# Patient Record
Sex: FEMALE | Race: ASIAN | Employment: OTHER | URBAN - METROPOLITAN AREA
[De-identification: names, ages, dates, MRNs, and addresses within clinical notes are randomized per-mention and may not be internally consistent; named-entity substitution may affect disease eponyms.]

---

## 2017-10-05 ENCOUNTER — HOSPITAL ENCOUNTER (OUTPATIENT)
Dept: MAMMOGRAPHY | Age: 62
Discharge: HOME | End: 2017-10-05
Attending: FAMILY MEDICINE
Payer: COMMERCIAL

## 2017-10-05 DIAGNOSIS — Z12.31 VISIT FOR SCREENING MAMMOGRAM: ICD-10-CM

## 2017-10-05 PROCEDURE — 77067 SCR MAMMO BI INCL CAD: CPT

## 2017-11-30 ENCOUNTER — HOSPITAL ENCOUNTER (OUTPATIENT)
Dept: LAB | Age: 62
Discharge: HOME | End: 2017-11-30

## 2017-11-30 ENCOUNTER — HOSPITAL ENCOUNTER (OUTPATIENT)
Dept: LAB | Age: 62
Discharge: HOME | End: 2017-11-30
Payer: COMMERCIAL

## 2017-11-30 ENCOUNTER — HOSPITAL ENCOUNTER (OUTPATIENT)
Dept: GENERAL RADIOLOGY | Age: 62
Discharge: HOME | End: 2017-11-30
Payer: COMMERCIAL

## 2017-11-30 DIAGNOSIS — Z01.810 PRE-OPERATIVE CARDIOVASCULAR EXAMINATION: ICD-10-CM

## 2017-11-30 DIAGNOSIS — Z01.811 ENCOUNTER FOR PREOPERATIVE PULMONARY EXAMINATION: ICD-10-CM

## 2017-11-30 PROCEDURE — 93005 ELECTROCARDIOGRAM TRACING: CPT

## 2017-11-30 PROCEDURE — 71020 XR CHEST PA LAT: CPT

## 2017-11-30 PROCEDURE — 99001 SPECIMEN HANDLING PT-LAB: CPT | Performed by: OBSTETRICS & GYNECOLOGY

## 2017-12-01 LAB
ATRIAL RATE: 62 BPM
CALCULATED P AXIS, ECG09: 78 DEGREES
CALCULATED R AXIS, ECG10: 65 DEGREES
CALCULATED T AXIS, ECG11: 68 DEGREES
DIAGNOSIS, 93000: NORMAL
P-R INTERVAL, ECG05: 158 MS
Q-T INTERVAL, ECG07: 412 MS
QRS DURATION, ECG06: 88 MS
QTC CALCULATION (BEZET), ECG08: 418 MS
VENTRICULAR RATE, ECG03: 62 BPM

## 2018-07-05 ENCOUNTER — OFFICE VISIT (OUTPATIENT)
Dept: UROLOGY | Age: 63
End: 2018-07-05

## 2018-07-05 VITALS
SYSTOLIC BLOOD PRESSURE: 121 MMHG | HEIGHT: 60 IN | WEIGHT: 110 LBS | DIASTOLIC BLOOD PRESSURE: 76 MMHG | BODY MASS INDEX: 21.6 KG/M2 | HEART RATE: 75 BPM | OXYGEN SATURATION: 95 %

## 2018-07-05 DIAGNOSIS — N95.2 ATROPHIC VAGINITIS: ICD-10-CM

## 2018-07-05 DIAGNOSIS — N39.0 RECURRENT UTI: Primary | ICD-10-CM

## 2018-07-05 RX ORDER — ESTRADIOL 0.1 MG/G
CREAM VAGINAL
Qty: 42.5 G | Refills: 6 | Status: SHIPPED | OUTPATIENT
Start: 2018-07-05 | End: 2018-07-05 | Stop reason: SDUPTHER

## 2018-07-05 RX ORDER — CIPROFLOXACIN 250 MG/1
TABLET, FILM COATED ORAL EVERY 12 HOURS
COMMUNITY
End: 2018-10-18

## 2018-07-05 RX ORDER — IBANDRONATE SODIUM 150 MG/1
150 TABLET, FILM COATED ORAL
COMMUNITY
Start: 2018-03-31

## 2018-07-05 RX ORDER — ESTRADIOL 0.1 MG/G
CREAM VAGINAL
Qty: 42.5 G | Refills: 0 | Status: SHIPPED | OUTPATIENT
Start: 2018-07-05

## 2018-07-05 RX ORDER — PHENAZOPYRIDINE HYDROCHLORIDE 200 MG/1
TABLET, FILM COATED ORAL
COMMUNITY

## 2018-07-05 NOTE — PROGRESS NOTES
Ms. Candido Ward has a reminder for a \"due or due soon\" health maintenance. I have asked that she contact her primary care provider for follow-up on this health maintenance. RBV Per Dr. Stanford Goodwin Cream 0.1 % apply 0.5 grams with fingertip to urethral opening twice weekly #42.5 grams with 6 refills sent to pharmacy.

## 2018-07-05 NOTE — MR AVS SNAPSHOT
5 [facility] [address] [facility] [address] 
[phone] Patient: Be Menchaca MRN: [id number] OVY:[birth year] Visit Information Date & Time Provider Department Dept. Phone Encounter #  
 7/5/2018  2:15 PM Gagan Castro, Brad [facility] [phone] Upcoming Health Maintenance Date Due Hepatitis C Screening 1955 DTaP/Tdap/Td series (1 - Tdap) 9/28/1974 PAP AKA CERVICAL CYTOLOGY 9/28/1974 FOBT Q 1 YEAR AGE 50-75 9/28/2003 ZOSTER VACCINE AGE 60> 7/28/2013 Bone Densitometry (Dexa) Screening 9/28/2018 Influenza Age 5 to Adult 8/1/2018 BREAST CANCER SCRN MAMMOGRAM 10/5/2019 Allergies as of 7/5/2018  Review Complete On: 7/5/2018 By: Gagan Castro MD  
 No Known Allergies Current Immunizations  Never Reviewed No immunizations on file. Not reviewed this visit You Were Diagnosed With   
  
 Codes Comments Recurrent UTI    -  Primary ICD-10-CM: N39.0 ICD-9-CM: 599.0 Vitals BP Pulse Height(growth percentile) Weight(growth percentile) SpO2 BMI  
 121/76 (BP 1 Location: Left arm, BP Patient Position: Sitting) 75 5' (1.524 m) 110 lb (49.9 kg) 95% 21.48 kg/m2 Smoking Status Never Smoker Vitals History BMI and BSA Data Body Mass Index Body Surface Area  
 21.48 kg/m 2 1.45 m 2 Preferred Pharmacy Pharmacy Name Phone [address], [address] [place] [address] [phone] Your Updated Medication List  
  
   
This list is accurate as of 7/5/18  2:55 PM.  Always use your most recent med list.  
  
  
  
  
 CIPRO 250 mg tablet Generic drug:  ciprofloxacin HCl Take  by mouth every twelve (12) hours. ibandronate 150 mg tablet Commonly known as:  [place] Cinnamon Take 150 mg by mouth every thirty (30) days. PYRIDIUM 200 mg tablet Generic drug:  phenazopyridine Take  by mouth three (3) times daily as needed for Pain. URIBEL PO Take  by mouth. We Performed the Following AMB POC URINALYSIS DIP STICK AUTO W/O MICRO [16508 CPT(R)] IN LORIE,POST-VOID RES,US,NON-IMAGING [id number] CPT(R)] Patient Instructions Frequent Urination: Care Instructions Your Care Instructions An urge to urinate frequently but usually passing only small amounts of urine is a common symptom of urinary problems, such as urinary tract infections. The bladder may become inflamed. This can cause the urge to urinate. You may try to urinate more often than usual to try to soothe that urge. Frequent urination also may be caused by sexually transmitted infections (STIs) or kidney stones. Or it may happen when something irritates the tube that carries urine from the bladder to the outside of the body (urethra). It may also be a sign of diabetes. The cause may be hard to find. You may need tests. Follow-up care is a key part of your treatment and safety. Be sure to make and go to all appointments, and call your doctor if you are having problems. It's also a good idea to know your test results and keep a list of the medicines you take. How can you care for yourself at home? · Drink extra water for the next day or two. This will help make the urine less concentrated. (If you have kidney, heart, or liver disease and have to limit fluids, talk with your doctor before you increase the amount of fluids you drink.) · Avoid drinks that are carbonated or have caffeine. They can irritate the bladder. For women: · Urinate right after you have sex. · After you go to the bathroom, wipe from front to back. · Avoid douches, bubble baths, and feminine hygiene sprays. And avoid other feminine hygiene products that have deodorants. When should you call for help? Call your doctor now or seek immediate medical care if: ? · You have new symptoms, such as fever, nausea, or vomiting. ? · You have new or worse symptoms of a urinary problem. For example: ¨ You have blood or pus in your urine. ¨ You have chills or body aches. ¨ It hurts to urinate. ¨ You have groin or belly pain. ¨ You have pain in your back just below your rib cage (the flank area). ? Watch closely for changes in your health, and be sure to contact your doctor if you feel thirstier than usual. 
Where can you learn more? Go to http://елена-mariela.info/. Enter 486 9543 in the search box to learn more about \"Frequent Urination: Care Instructions. \" Current as of: May 12, 2017 Content Version: 11.4 © 8046-3598 [employer]. Care instructions adapted under license by [employer] (which disclaims liability or warranty for this information). If you have questions about a medical condition or this instruction, always ask your healthcare professional. [place] [address] any warranty or liability for your use of this information. Introducing hospitals & HEALTH SERVICES! [facility] introduces Gentis patient portal. Now you can access parts of your medical record, email your doctor's office, and request medication refills online. 1. In your internet browser, go to [email or web address] 2. Click on the First Time User? Click Here link in the Sign In box. You will see the New Member Sign Up page. 3. Enter your Gentis Access Code exactly as it appears below. You will not need to use this code after youve completed the sign-up process. If you do not sign up before the expiration date, you must request a new code. · Gentis Access Code: [email or web address] Expires: 10/3/2018  2:55 PM 
 
4. Enter the last four digits of your Social Security Number (xxxx) and Date of Birth (mm/dd/yyyy) as indicated and click Submit. You will be taken to the next sign-up page. 5. Create a Navigenics ID. This will be your Navigenics login ID and cannot be changed, so think of one that is secure and easy to remember. 6. Create a Navigenics password. You can change your password at any time. 7. Enter your Password Reset Question and Answer. This can be used at a later time if you forget your password. 8. Enter your e-mail address. You will receive e-mail notification when new information is available in [address] 9. Click Sign Up. You can now view and download portions of your medical record. 10. Click the Download Summary menu link to download a portable copy of your medical information. If you have questions, please visit the Frequently Asked Questions section of the Navigenics website. Remember, Navigenics is NOT to be used for urgent needs. For medical emergencies, dial 911. Now available from your iPhone and Android! Please provide this summary of care documentation to your next provider. Your primary care clinician is listed as [address]. If you have any questions after today's visit, please call [phone].

## 2018-07-05 NOTE — PATIENT INSTRUCTIONS
Frequent Urination: Care Instructions  Your Care Instructions  An urge to urinate frequently but usually passing only small amounts of urine is a common symptom of urinary problems, such as urinary tract infections. The bladder may become inflamed. This can cause the urge to urinate. You may try to urinate more often than usual to try to soothe that urge. Frequent urination also may be caused by sexually transmitted infections (STIs) or kidney stones. Or it may happen when something irritates the tube that carries urine from the bladder to the outside of the body (urethra). It may also be a sign of diabetes. The cause may be hard to find. You may need tests. Follow-up care is a key part of your treatment and safety. Be sure to make and go to all appointments, and call your doctor if you are having problems. It's also a good idea to know your test results and keep a list of the medicines you take. How can you care for yourself at home? · Drink extra water for the next day or two. This will help make the urine less concentrated. (If you have kidney, heart, or liver disease and have to limit fluids, talk with your doctor before you increase the amount of fluids you drink.)  · Avoid drinks that are carbonated or have caffeine. They can irritate the bladder. For women:  · Urinate right after you have sex. · After you go to the bathroom, wipe from front to back. · Avoid douches, bubble baths, and feminine hygiene sprays. And avoid other feminine hygiene products that have deodorants. When should you call for help? Call your doctor now or seek immediate medical care if:  ? · You have new symptoms, such as fever, nausea, or vomiting. ? · You have new or worse symptoms of a urinary problem. For example:  ¨ You have blood or pus in your urine. ¨ You have chills or body aches. ¨ It hurts to urinate. ¨ You have groin or belly pain. ¨ You have pain in your back just below your rib cage (the flank area). ?Watch closely for changes in your health, and be sure to contact your doctor if you feel thirstier than usual.  Where can you learn more? Go to http://елена-mariela.info/. Enter 728 [id number] in the search box to learn more about \"Frequent Urination: Care Instructions. \"  Current as of: May 12, 2017  Content Version: 11.4  © 0459-3600 ROLI. Care instructions adapted under license by [employer] (which disclaims liability or warranty for this information). If you have questions about a medical condition or this instruction, always ask your healthcare professional. [place] [address] any warranty or liability for your use of this information.

## 2018-07-05 NOTE — PROGRESS NOTES
Chief Complaint   Patient presents with    New Patient    Recurrent UTI       HISTORY OF PRESENT ILLNESS:  Myles Deluna is a 57 y.o. female comes in with a history of recurrent urinary and infections usually manifested by dysuria which she has had blood on one occasion several years ago. She has been using uribel when she first has symptoms and usually that mitigates all of her problem. However recently that has not been helping. Earlier this week she developed another severe urinary infection and went to her primary care physician who prescribed Cipro and asked that she be seen over at our office. She has no significant history of kidney disorders or stone disease. She has not had any  gynecologic surgery. She had been using an estrogen patch but it became too expensive and so she stopped doing that about 6 or 8 months ago. She seems to think that that has helped her a good bit. Past Medical History:   Diagnosis Date    Urinary frequency        Past Surgical History:   Procedure Laterality Date    HX DILATION AND CURETTAGE  01/2018       Social History   Substance Use Topics    Smoking status: Never Smoker    Smokeless tobacco: Never Used    Alcohol use No       No Known Allergies    History reviewed. No pertinent family history. Current Outpatient Prescriptions   Medication Sig Dispense Refill    ibandronate (BONIVA) 150 mg tablet Take 150 mg by mouth every thirty (30) days.  meth/meblue/sod phos/psal/hyos (URIBEL PO) Take  by mouth.  ciprofloxacin HCl (CIPRO) 250 mg tablet Take  by mouth every twelve (12) hours.  phenazopyridine (PYRIDIUM) 200 mg tablet Take  by mouth three (3) times daily as needed for Pain.  estradiol (ESTRACE) 0.01 % (0.1 mg/gram) vaginal cream Apply 0.5 grams with fingertip to urethral opening twice weekly 42.5 g 0           REVIEW OF SYSTEMS:   Noted on the chart.       PHYSICAL EXAMINATION:     Visit Vitals    /76 (BP 1 Location: Left arm, BP Patient Position: Sitting)    Pulse 75    Ht 5' (1.524 m)    Wt 110 lb (49.9 kg)    SpO2 95%    BMI 21.48 kg/m2     Constitutional: Well developed, well-nourished female in no acute distress. CV:  No peripheral swelling noted  Respiratory: No respiratory distress or difficulties  Abdomen:  Soft and nontender. No masses. No hepatosplenomegaly.  Female:  No CVA tenderness. A pelvic is deferred today. Skin:  Normal color. No evidence of jaundice. Neuro/Psych:  Patient with appropriate affect. Alert and oriented. Lymphatic:   No enlargement of supraclavicular lymph nodes. Results for orders placed or performed during the hospital encounter of 11/30/17   EKG, 12 LEAD, INITIAL   Result Value Ref Range    Ventricular Rate 62 BPM    Atrial Rate 62 BPM    P-R Interval 158 ms    QRS Duration 88 ms    Q-T Interval 412 ms    QTC Calculation (Bezet) 418 ms    Calculated P Axis 78 degrees    Calculated R Axis 65 degrees    Calculated T Axis 68 degrees    Diagnosis       Normal sinus rhythm  Normal ECG  No previous ECGs available  Confirmed by Bhupinder Webb ([id number]) on 12/1/2017 7:56:05 AM           REVIEW OF LABS AND IMAGING:      Imaging Report Reviewed? NO      Images Reviewed? NO           Other Lab Data Reviewed? YES    ASSESSMENT:     ICD-10-CM ICD-9-CM    1. Recurrent UTI N39.0 599.0 AMB POC URINALYSIS DIP STICK AUTO W/O MICRO      CA LORIE,POST-VOID RES,US,NON-IMAGING   2. Atrophic vaginitis N95.2 627.3 estradiol (ESTRACE) 0.01 % (0.1 mg/gram) vaginal cream      DISCONTINUED: estradiol (ESTRACE) 0.01 % (0.1 mg/gram) vaginal cream                PLAN/DISCUSSION: She and I had a lengthy discussion about the use of estrogen type creams in ladies who are past menopause and she clearly benefit from this I think. I have had a prescription for Estrace vaginal cream 42.5 g sent to the [facility] [address]. I would like to see her again in 3 months.   We will culture her urine today just to be sure that she is not growing out a bacteria resistant to Cipro. Patient voices understanding and agreement to the plan. Lia Pardo MD on 7/5/2018           Please note: This document has been produced using voice recognition software. Unrecognized errors in transcription may be present.

## 2018-07-06 LAB
BILIRUB UR QL STRIP: NEGATIVE
GLUCOSE UR-MCNC: NEGATIVE MG/DL
KETONES P FAST UR STRIP-MCNC: NEGATIVE MG/DL
PH UR STRIP: 5 [PH] (ref 4.6–8)
PROT UR QL STRIP: NEGATIVE
SP GR UR STRIP: 1.02 (ref 1–1.03)
UA UROBILINOGEN AMB POC: NORMAL (ref 0.2–1)
URINALYSIS CLARITY POC: NORMAL
URINALYSIS COLOR POC: NORMAL
URINE BLOOD POC: NEGATIVE
URINE LEUKOCYTES POC: NEGATIVE
URINE NITRITES POC: POSITIVE

## 2018-07-07 LAB — BACTERIA UR CULT: NO GROWTH

## 2018-09-18 ENCOUNTER — HOSPITAL ENCOUNTER (OUTPATIENT)
Dept: GENERAL RADIOLOGY | Age: 63
Discharge: HOME | End: 2018-09-18
Payer: MEDICARE

## 2018-09-18 DIAGNOSIS — R20.0 NUMBNESS AND TINGLING IN LEFT ARM: ICD-10-CM

## 2018-09-18 DIAGNOSIS — R20.2 NUMBNESS AND TINGLING IN LEFT ARM: ICD-10-CM

## 2018-09-18 PROCEDURE — 72040 X-RAY EXAM NECK SPINE 2-3 VW: CPT

## 2018-09-18 PROCEDURE — 73030 X-RAY EXAM OF SHOULDER: CPT

## 2018-09-20 ENCOUNTER — HOSPITAL ENCOUNTER (OUTPATIENT)
Dept: PHYSICAL THERAPY | Age: 63
Discharge: HOME | End: 2018-09-20
Payer: MEDICARE

## 2018-09-20 PROCEDURE — 97110 THERAPEUTIC EXERCISES: CPT

## 2018-09-20 PROCEDURE — 97162 PT EVAL MOD COMPLEX 30 MIN: CPT

## 2018-09-20 PROCEDURE — G8984 CARRY CURRENT STATUS: HCPCS

## 2018-09-20 PROCEDURE — 97535 SELF CARE MNGMENT TRAINING: CPT

## 2018-09-20 PROCEDURE — G8985 CARRY GOAL STATUS: HCPCS

## 2018-09-20 NOTE — PROGRESS NOTES
In [address]  [place] [address] [place], [address] 
[phone] fax Plan of Care/ Statement of Necessity for Physical Therapy Services Patient name: Ketty Cruz Start of Care: 2018 Referral source: Alison Leone MD : 1955 Medical Diagnosis: Left shoulder pain [M25.512] Left shoulder tendinitis [M75.82] Onset Date:5-6 months ago Treatment Diagnosis: Left shoulder adhesive capsulitis Prior Hospitalization: see medical history Provider#: [id number] Medications: Verified on Patient summary List  
 Comorbidities: unremarkable per pt report Prior Level of Function: walking 3 days/ week for an hour, occasional swimming and other exercise The Plan of Care and following information is based on the information from the initial evaluation. Assessment/ key information: Pt is a 57year old female presenting with onset of L shoulder pain 5-6 months ago with reported of limited ability to reach out to put on her seatbelt, recah behind or overhead, and pain limited ease of swimming. She also reports intermittent \"lightning\" pains referring into her left arm with movement and pain provocation in her L shoulder with Left sidelying. She reports her pain has gradually worsened over time. To date, she has utilized NSAIDs, acupuncture, and a PCP f/u with 2 cortizone injections 2 days ago with some limited relief. X-ray imaging of her neck and shoulder were unremarkable. Signs and symptoms appear consistent with L GHJ adhesive capsulitis as evidenced by painful capsular limitations in L shoulder passive mobility with associated painful active ROM, minimal noted weakness in the LUE, and (-) cervical screen/neurological screen. Pt will benefit from skilled PT to address her impairments and improve her level of function to facilitate return to PLOF.  
Evaluation Complexity History MEDIUM  Complexity : 1-2 comorbidities / personal factors will impact the outcome/ POC ; Examination MEDIUM Complexity : 3 Standardized tests and measures addressing body structure, function, activity limitation and / or participation in recreation  ;Presentation MEDIUM Complexity : Evolving with changing characteristics  ; Clinical Decision Making MEDIUM Complexity : FOTO score of 26-74 Overall Complexity Rating: MEDIUM Problem List: pain affecting function, decrease ROM, decrease strength, decrease ADL/ functional abilitiies, decrease activity tolerance and decrease flexibility/ joint mobility Treatment Plan may include any combination of the following: Therapeutic exercise, Therapeutic activities, Neuromuscular re-education, Physical agent/modality, Manual therapy, Patient education and Self Care training Patient / Family readiness to learn indicated by: asking questions, trying to perform skills and interest 
Persons(s) to be included in education: patient (P) Barriers to Learning/Limitations: None Patient Goal (s): Not reported. Patient Self Reported Health Status: good Rehabilitation Potential: excellent Short Term Goals: To be accomplished in 2 weeks: 1. Patient will report performance of HEP at least 2 times per day to facilitate improved outcomes and improved self management. 2. Pt will demonstrate L shoulder PROM ABD to 120, ER @ 90 to 65, and IR @ 90 to 45 or better to indicate improving GHJ mobility and improve ease of personal grooming activities. Long Term Goals: To be accomplished in 8 weeks: 1. Patient will report FOTO score of 67 or better to indicate significant improvement in functional status. 2. Pt will demonstrate L shoulder AROM flexion to 145 or better to improve ease of OH reach. 3. Pt will demonstrate L shoulder PROM to , ER @ 90 to 80, and IR @ 90 to 60 or better to improve capsular mobility and facilitate return to PLOF. 4. Pt will demonstrate L shoulder strength 5/5 to improve ease of OH reach. Frequency / Duration: Patient to be seen 2 times per week for 8 weeks. Patient/ Caregiver education and instruction: Diagnosis, prognosis, self care, activity modification and exercises 
 [x]  Plan of care has been reviewed with PTA 
 
G-Codes (GP) Carry  Current  CK= 40-59%  Goal  CJ= 20-39% The severity rating is based on clinical judgment and the FOTO score. Certification Period: 9/20/2018 - 11/20/2018 Dre Hatfield PT, DPT, ATC 9/20/2018 11:39 AM 
 
________________________________________________________________________ I certify that the above Therapy Services are being furnished while the patient is under my care. I agree with the treatment plan and certify that this therapy is necessary. [de-identified] Signature:____________________  Date:____________Time: _________ Please sign and return to In [address] [place] ([place]), [address] 
[phone] fax

## 2018-09-20 NOTE — PROGRESS NOTES
PT DAILY TREATMENT NOTE/SHOULDER EVAL 3-16 Patient Name: Jackie Ku Date:2018 : 1955 [x]  Patient  Verified Payor: [place] [facility] / Plan: [address] / Product Type: Medicare / In time:10:05am  Out time:10:46am 
Total Treatment Time (min): 41 Total Timed Codes (min): 25 
1:1 Treatment Time ([facility] only): 41 Visit #: 1 of 16 Treatment Area: Left shoulder pain [M25.512] Left shoulder tendinitis [M75.82] SUBJECTIVE Pain Level (0-10 scale): 6 [x]constant []intermittent []improving [x]worsening []no change since onset Any medication changes, allergies to medications, adverse drug reactions, diagnosis change, or new procedure performed?: [x] No    [] Yes (see summary sheet for update) Subjective functional status/changes:    
Pt reports onset of L shoulder pain 5-6 months ago with reported of limited ability to reach out to put on her seatbelt, recah behind or overhead, and pain limited ease of swimming. She also reports intermittent \"lightning\" pains referring into her left arm with movement and pain provocation in her L shoulder with Left sidelying. She reports her pain has gradually worsened over time. To date, she has utilized NSAIDs, acupuncture, and a PCP f/u with 2 cortizone injections 2 days ago with some limited relief. X-ray imaging of her neck and shoulder were unremarkable. PLOF: walking 3 days/ week for an hour, occasional swimming and other exercise Limitations to PLOF: limited ease of shoulder movement and elevation as above Mechanism of Injury: insidious onset Current symptoms/Complaints: see above Previous Treatment/Compliance: see above PMHx/Surgical Hx: unremarkable per pt report Work Hx: see intake Living Situation:  
Pt Goals: see intake Barriers: []pain []financial []time []transportation []other Motivation: appears well motivated and cooperative Substance use: []Alcohol []Tobacco []other:  
FABQ Score: []low []elevate Cognition: A & O x 4    Other: OBJECTIVE/EXAMINATION Domestic Life:  
Activity/Recreational Limitations:  
Mobility:  
Self Care:  
 
16 min [x]Eval                  []Re-Eval  
 
12 min Therapeutic Exercise:  [] See flow sheet : HEP creation and instruction per handout Rationale: increase ROM and increase strength to improve the patients ability to improve ADL ease. Self Care: 13 minutes pt education regarding relevant anatomy, diagnosis, prognosis, plan of care, activity modification, self modality use, to facilitate pt self management and improve outcomes. With 
 [] TE 
 [] TA 
 [] neuro 
 [] other: Patient Education: [x] Review HEP [] Progressed/Changed HEP based on:  
[] positioning   [] body mechanics   [] transfers   [] heat/ice application   
[] other:   
 
Other Objective/Functional Measures:  
 
Physical Therapy Evaluation - Shoulder Posture: [] Poor    [] Fair    [x] Good    Describe: 
 
ROM:  [] Unable to assess at this time AROM                                                              PROM Left Right  Left Right Flexion 112 177 Flexion 136 185 Extension   Extension Scaption/ABD   Scaptin/ABD 96 170 ER @ 0 Degrees   ER @ 0 Degrees ER @ 90 Degrees   ER @ 90 Degrees 50 110 IR @ 90 Degrees   IR @ 90 Degrees 26 80 JUANITO: R T2, L C6 pain FIR: R T5, L L5 pain End Feel / Painful Arc: 
 
Strength:   [] Unable to assess at this time L (1-5) R (1-5) Pain Flexors 4+ 5 [x] Yes   [] No  
Abductors 4 4+ [x] Yes   [] No  
External Rotators 4+ 5 [] Yes   [x] No  
Internal Rotators 5 5 [] Yes   [x] No  
Supraspinatus   [] Yes   [] No  
Serratus Anterior   [] Yes   [] No  
Lower Trapezius   [] Yes   [] No  
Elbow Flexion 5 5 [] Yes   [] No  
Elbow Extension 5 5 [] Yes   [] No  
 
 
Scapulohumoral Control / Rhythm: Able to eccentrically lower with good control? Left: [] Yes   [x] No     Right: [x] Yes   [] No 
 
Accessory Motions: 
 
Palpation 
[x] Min  [] Mod  [] Severe    Location: GHJ, bicipital sulcus [] Min  [] Mod  [] Severe    Location: 
[] Min  [] Mod  [] Severe    Location: 
 
Optional Tests:   
Sensation Left Right Reflexes Left Right Biceps (C5) WNL WNL Biceps (C5) 2+ 2+ Mello Radial(C6-7) WNL WNL Brachioradialis (C6) 1+ 1+ Mello Ulnar(C8-T1) WNL WNL Triceps (C7) 2+ 2+ Adson's Test  [] Pos   [] Neg Yergason's Test [] Pos   [] Neg 
Yarely's Test  [] Pos   [] Neg Worcester's Sign [] Pos   [] Neg Neer's Test  [] Pos   [] Neg Clunk Test  [] Pos   [] Neg 
Hawkin's Test  [] Pos   [] Neg AC Joint  [] Pos   [] Neg 
Speed's Test  [] Pos   [] Neg SC Joint  [] Pos   [] Neg 
Empty Can  [] Pos   [] Neg Pectoral Tightness [] Pos   [] Neg Anterior Apprehension [] Pos   [] Neg  
Posterior Apprehension [] Pos   [] Neg Other Tests / Comments:   
 
Pain Level (0-10 scale) post treatment: 6 
 
ASSESSMENT/Changes in Function: Per POC. Patient will continue to benefit from skilled PT services to modify and progress therapeutic interventions, address functional mobility deficits, address ROM deficits, address strength deficits, analyze and address soft tissue restrictions, analyze and cue movement patterns, analyze and modify body mechanics/ergonomics and instruct in home and community integration to attain remaining goals. [x]  See Plan of Care 
[]  See progress note/recertification 
[]  See Discharge Summary Progress towards goals / Updated goals: 
Per POC. PLAN [x]  Upgrade activities as tolerated     [x]  Continue plan of care 
[]  Update interventions per flow sheet      
[]  Discharge due to:_ 
[x]  Other:_emphasize shoulder ROM and low load long duration stretching, as mobility improves, provide neuro re-ed PRN and progress towards independent HEP.    
 
Aretha Leyden 9/20/2018  10:13 AM

## 2018-09-25 ENCOUNTER — HOSPITAL ENCOUNTER (OUTPATIENT)
Dept: PHYSICAL THERAPY | Age: 63
Discharge: HOME | End: 2018-09-25
Payer: MEDICARE

## 2018-09-25 PROCEDURE — 97140 MANUAL THERAPY 1/> REGIONS: CPT

## 2018-09-25 PROCEDURE — 97110 THERAPEUTIC EXERCISES: CPT

## 2018-09-25 NOTE — PROGRESS NOTES
PT DAILY TREATMENT NOTE - [facility]  Patient Name: Tory Dahl Date:2018 : 1955 [x]  Patient  Verified Payor: [place] MEDICARE / Plan: [address] / Product Type: Medicare / In time:11:30  Out time:12:12 Total Treatment Time (min): 42 Total Timed Codes (min): 32 
1:1 Treatment Time ( only): 30 Visit #: 2 of 16 Treatment Area: Left shoulder pain [M25.512] Left shoulder tendinitis [M75.82] SUBJECTIVE Pain Level (0-10 scale): 4-5/10 Any medication changes, allergies to medications, adverse drug reactions, diagnosis change, or new procedure performed?: [x] No    [] Yes (see summary sheet for update) Subjective functional status/changes:   [] No changes reported Pt reports no new complaints of pain. Pt reports performing HEP and having decreased pain and increased movement in her shoulder OBJECTIVE Modality rationale: decrease inflammation and decrease pain to improve the patients ability to tolerate ADLs. Min Type Additional Details  
 [] Estim:  []Unatt       []IFC  []Premod []Other:  []w/ice   []w/heat Position: Location:  
 [] Estim: []Att    []TENS instruct  []NMES []Other:  []w/US   []w/ice   []w/heat Position: Location:  
 []  Traction: [] Cervical       []Lumbar 
                     [] Prone          []Supine []Intermittent   []Continuous Lbs: 
[] before manual 
[] after manual  
 []  Ultrasound: []Continuous   [] Pulsed []1MHz   []3MHz W/cm2: 
Location:  
 []  Iontophoresis with dexamethasone Location: [] Take home patch  
[] In clinic  
10 [x]  Ice     []  heat 
[]  Ice massage 
[]  Laser  
[]  Anodyne Position: supine Location: left shoulder  
 []  Laser with stim 
[]  Other:  Position: Location:  
 []  Vasopneumatic Device Pressure:       [] lo [] med [] hi  
Temperature: [] lo [] med [] hi  
 [] Skin assessment post-treatment:  []intact []redness- no adverse reaction 
  []redness  adverse reaction:  
 
24 min Therapeutic Exercise:  [x] See flow sheet :  
Rationale: increase ROM and increase strength to improve the patients ability to tolerate ADLs. 8 min Manual Therapy:  PROM left shoulder Rationale: decrease pain, increase ROM and increase tissue extensibility to improve tolerance to functional activities. With 
 [] TE 
 [] TA 
 [] neuro 
 [] other: Patient Education: [x] Review HEP [] Progressed/Changed HEP based on:  
[] positioning   [] body mechanics   [] transfers   [] heat/ice application   
[] other:   
 
Other Objective/Functional Measures: Initiated exercises as per flow sheet. Pain Level (0-10 scale) post treatment: 4-5/10 ASSESSMENT/Changes in Function: Pt continues to demonstrate left shoulder capsular tightness with improved shoulder flexion noted. Patient will continue to benefit from skilled PT services to modify and progress therapeutic interventions, address functional mobility deficits, address ROM deficits, address strength deficits, analyze and address soft tissue restrictions, analyze and cue movement patterns and analyze and modify body mechanics/ergonomics to attain remaining goals. []  See Plan of Care 
[]  See progress note/recertification 
[]  See Discharge Summary Progress towards goals / Updated goals: 
Short Term Goals: To be accomplished in 2 weeks: 1. Patient will report performance of HEP at least 2 times per day to facilitate improved outcomes and improved self management. - Met per patient report. 9/25/18 2. Pt will demonstrate L shoulder PROM ABD to 120, ER @ 90 to 65, and IR @ 90 to 45 or better to indicate improving GHJ mobility and improve ease of personal grooming activities. Long Term Goals: To be accomplished in 8 weeks: 1. Patient will report FOTO score of 67 or better to indicate significant improvement in functional status. 2. Pt will demonstrate L shoulder AROM flexion to 145 or better to improve ease of OH reach. 3. Pt will demonstrate L shoulder PROM to , ER @ 90 to 80, and IR @ 90 to 60 or better to improve capsular mobility and facilitate return to PLOF. 4. Pt will demonstrate L shoulder strength 5/5 to improve ease of OH reach. PLAN 
[]  Upgrade activities as tolerated     [x]  Continue plan of care 
[]  Update interventions per flow sheet      
[]  Discharge due to:_ 
[]  Other:_ Brennan Cadena, KULWANT 9/25/2018  11:44 AM 
 
Future Appointments Date Time Provider Kristi Rich 9/28/2018 10:00 AM [facility]  
10/2/2018 11:00 AM [facility]ev Part[facility]  
10/5/2018 11:00 AM Rajeev Part, PTA [facility]  
10/9/2018 11:00 AM Rajeev Part, PTA [facility]  
10/12/2018 11:00 AM Brennan Cadena, [facility]  
10/16/2018 11:00 AM PAU [facility]  
10/18/2018 10:15 AM Layo Trotter MD [address] [place] [address]

## 2018-09-28 ENCOUNTER — HOSPITAL ENCOUNTER (OUTPATIENT)
Dept: PHYSICAL THERAPY | Age: 63
Discharge: HOME | End: 2018-09-28
Payer: MEDICARE

## 2018-09-28 PROCEDURE — 97140 MANUAL THERAPY 1/> REGIONS: CPT

## 2018-09-28 PROCEDURE — 97110 THERAPEUTIC EXERCISES: CPT

## 2018-09-28 NOTE — PROGRESS NOTES
PT DAILY TREATMENT NOTE - [facility]  Patient Name: Adrienne Cha Date:2018 : 1955 [x]  Patient  Verified Payor: [place] MEDICARE / Plan: [address] / Product Type: Medicare / In time:10:00  Out time:10:48 Total Treatment Time (min): 48 Total Timed Codes (min): 38 
1:1 Treatment Time ( only): 38 Visit #: 3 of 16 Treatment Area: Left shoulder pain [M25.512] Left shoulder tendinitis [M75.82] SUBJECTIVE Pain Level (0-10 scale): 4-5/10 Any medication changes, allergies to medications, adverse drug reactions, diagnosis change, or new procedure performed?: [x] No    [] Yes (see summary sheet for update) Subjective functional status/changes:   [] No changes reported \"I've been exercising a lot, I can raise higher but still some pain. \" OBJECTIVE Modality rationale: decrease pain to improve the patients ability to perform ADL's. Min Type Additional Details  
 [] Estim:  []Unatt       []IFC  []Premod []Other:  []w/ice   []w/heat Position: Location:  
 [] Estim: []Att    []TENS instruct  []NMES []Other:  []w/US   []w/ice   []w/heat Position: Location:  
 []  Traction: [] Cervical       []Lumbar 
                     [] Prone          []Supine []Intermittent   []Continuous Lbs: 
[] before manual 
[] after manual  
 []  Ultrasound: []Continuous   [] Pulsed []1MHz   []3MHz W/cm2: 
Location:  
 []  Iontophoresis with dexamethasone Location: [] Take home patch  
[] In clinic  
10 [x]  Ice     []  heat 
[]  Ice massage 
[]  Laser  
[]  Anodyne Position: Supine Location: Left shoulder  
 []  Laser with stim 
[]  Other:  Position: Location:  
 []  Vasopneumatic Device Pressure:       [] lo [] med [] hi  
Temperature: [] lo [] med [] hi  
[] Skin assessment post-treatment:  []intact []redness- no adverse reaction 
  []redness  adverse reaction: 30 min Therapeutic Exercise:  [x] See flow sheet :  
Rationale: increase ROM, increase strength and increase proprioception to improve the patients ability to perform ADL's. 
 
8 min Manual Therapy:  Left ST/GH joint mobs, shoulder PROM, DTM Left UT, lev scap, infraspinatus, subscap. Rationale: decrease pain, increase ROM, increase tissue extensibility and decrease trigger points to improve ease of performing OH activities. With 
 [x] TE 
 [] TA 
 [] neuro 
 [] other: Patient Education: [x] Review HEP [] Progressed/Changed HEP based on:  
[] positioning   [] body mechanics   [] transfers   [] heat/ice application   
[] other:   
 
Other Objective/Functional Measures: Added post-capsule stretch and sleeper stretch 2x30\". Pain Level (0-10 scale) post treatment: 0/10 ASSESSMENT/Changes in Function: Demonstrated improved ease with wall slides, notable PROM improvement. Pt reports improved ease with ADL's and self care. Patient will continue to benefit from skilled PT services to modify and progress therapeutic interventions, address functional mobility deficits, address ROM deficits, address strength deficits, analyze and address soft tissue restrictions and analyze and cue movement patterns to attain remaining goals. [x]  See Plan of Care 
[]  See progress note/recertification 
[]  See Discharge Summary Progress towards goals / Updated goals: 
Short Term Goals: To be accomplished in 2 weeks: 
                      3. Patient will report performance of HEP at least 2 times per day to facilitate improved outcomes and improved self management. - Met per patient report. 9/25/18 
                      2. Pt will demonstrate L shoulder PROM ABD to 120, ER @ 90 to 65, and IR @ 90 to 45 or better to indicate improving GHJ mobility and improve ease of personal grooming activities. Long Term Goals: To be accomplished in 8 weeks:                       7. Patient will report FOTO score of 67 or better to indicate significant improvement in functional status.                       1. Pt will demonstrate L shoulder AROM flexion to 145 or better to improve ease of OH reach. 
                      3. Pt will demonstrate L shoulder PROM to , ER @ 90 to 80, and IR @ 90 to 60 or better to improve capsular mobility and facilitate return to PLOF.                       7. Pt will demonstrate L shoulder strength 5/5 to improve ease of OH reach. PLAN 
[]  Upgrade activities as tolerated     [x]  Continue plan of care 
[]  Update interventions per flow sheet      
[]  Discharge due to:_ 
[]  Other:_   
 
Osmani Rowley PTA 9/28/2018  9:55 AM 
 
Future Appointments Date Time Provider Kristi Perryi 9/28/2018 10:00 AM Osmani Rowley PTA [facility]  
10/2/2018 11:00 AM Osmani Rowley PTA [facility]  
10/5/2018 11:00 AM Osmani Rowley PTA [facility]  
10/9/2018 11:00 AM Osmani Rowley PTA [facility]  
10/12/2018 11:00 AM Ashleigh Aguirre PTA [facility]  
10/16/2018 11:00 AM PA[facility]OSS [facility]  
10/18/2018 10:15 AM Michael Monsalve MD [address] [place] [address]

## 2018-10-02 ENCOUNTER — HOSPITAL ENCOUNTER (OUTPATIENT)
Dept: PHYSICAL THERAPY | Age: 63
Discharge: HOME | End: 2018-10-02
Payer: MEDICARE

## 2018-10-02 PROCEDURE — 97140 MANUAL THERAPY 1/> REGIONS: CPT

## 2018-10-02 PROCEDURE — 97110 THERAPEUTIC EXERCISES: CPT

## 2018-10-02 NOTE — PROGRESS NOTES
PT DAILY TREATMENT NOTE - [facility]  Patient Name: Rosi Mclean Date:10/2/2018 : 1955 [x]  Patient  Verified Payor: [place] MEDICARE / Plan: [address] / Product Type: Medicare / In time:11:00  Out time:11:48 Total Treatment Time (min): 48 Total Timed Codes (min): 38 
1:1 Treatment Time ( only): 30 Visit #: 4 of 16 Treatment Area: Left shoulder pain [M25.512] Left shoulder tendinitis [M75.82] SUBJECTIVE Pain Level (0-10 scale): 5/10 Any medication changes, allergies to medications, adverse drug reactions, diagnosis change, or new procedure performed?: [x] No    [] Yes (see summary sheet for update) Subjective functional status/changes:   [] No changes reported \"\"It feels better but still stiff to the side. \" OBJECTIVE Modality rationale: decrease pain to improve the patients ability to perform ADL's. Min Type Additional Details  
 [] Estim:  []Unatt       []IFC  []Premod []Other:  []w/ice   []w/heat Position: Location:  
 [] Estim: []Att    []TENS instruct  []NMES []Other:  []w/US   []w/ice   []w/heat Position: Location:  
 []  Traction: [] Cervical       []Lumbar 
                     [] Prone          []Supine []Intermittent   []Continuous Lbs: 
[] before manual 
[] after manual  
 []  Ultrasound: []Continuous   [] Pulsed []1MHz   []3MHz W/cm2: 
Location:  
 []  Iontophoresis with dexamethasone Location: [] Take home patch  
[] In clinic  
10 [x]  Ice     []  heat 
[]  Ice massage 
[]  Laser  
[]  Anodyne Position: Supine Location: Left shoulder  
 []  Laser with stim 
[]  Other:  Position: Location:  
 []  Vasopneumatic Device Pressure:       [] lo [] med [] hi  
Temperature: [] lo [] med [] hi  
[] Skin assessment post-treatment:  []intact []redness- no adverse reaction 
  []redness  adverse reaction:  
 
30 min Therapeutic Exercise:  [x] See flow sheet :  
 Rationale: increase ROM, increase strength and increase proprioception to improve the patients ability to perform ADL's. 
 
8 min Manual Therapy:  Left ST/GH joint mobs, DTM/TPR Left infraspinatus, UT, lev scap, subscap. Shoulder PROM. Rationale: decrease pain, increase ROM, increase tissue extensibility and decrease trigger points to improve ease of performing OH activities and reaching laterally for daily chores. With 
 [x] TE 
 [] TA 
 [] neuro 
 [] other: Patient Education: [x] Review HEP [] Progressed/Changed HEP based on:  
[] positioning   [] body mechanics   [] transfers   [] heat/ice application   
[] other:   
 
Other Objective/Functional Measures: Changed supine wand to standing. PROM Left shoulder flexion 165, abd 140, ER 65 @ 65, IR 45 @ 90. Pain Level (0-10 scale) post treatment: 0/10 ASSESSMENT/Changes in Function: Improved PROM since IE. Able to perform wand standing. Significant tightness in Left infraspinatus. Patient will continue to benefit from skilled PT services to modify and progress therapeutic interventions, address functional mobility deficits, address ROM deficits, address strength deficits, analyze and address soft tissue restrictions and analyze and cue movement patterns to attain remaining goals. [x]  See Plan of Care 
[]  See progress note/recertification 
[]  See Discharge Summary Progress towards goals / Updated goals: 
Short Term Goals: To be accomplished in 2 weeks: 
                      4. Patient will report performance of HEP at least 2 times per day to facilitate improved outcomes and improved self management. - Met per patient report. 9/25/18 
                      2. Pt will demonstrate L shoulder PROM ABD to 120, ER @ 90 to 65, and IR @ 90 to 45 or better to indicate improving GHJ mobility and improve ease of personal grooming activities - PROM Left shoulder flexion 165, abd 140, ER 65 @ 65, IR 45 @ 90. 10/2/2018 Long Term Goals: To be accomplished in 8 weeks: 
                      6. Patient will report FOTO score of 67 or better to indicate significant improvement in functional status.                       1. Pt will demonstrate L shoulder AROM flexion to 145 or better to improve ease of OH reach. 
                      3. Pt will demonstrate L shoulder PROM to , ER @ 90 to 80, and IR @ 90 to 60 or better to improve capsular mobility and facilitate return to PLOF.                       8. Pt will demonstrate L shoulder strength 5/5 to improve ease of OH reach. PLAN 
[]  Upgrade activities as tolerated     [x]  Continue plan of care 
[]  Update interventions per flow sheet      
[]  Discharge due to:_ 
[]  Other:_   
 
Micki Labrum, PTA 10/2/2018  11:11 AM 
 
Future Appointments Date Time Provider Kristi Rich 10/5/2018 11:00 AM [facility]  
10/9/2018 11:00 AM Micki Gallego, PTA [facility]  
10/12/2018 11:00 AM Don Davison PTA [facility]  
10/16/2018 11:00 AM [facility]  
10/18/2018 10:15 AM Deborah Herbert MD [address] [place] [address]

## 2018-10-05 ENCOUNTER — HOSPITAL ENCOUNTER (OUTPATIENT)
Dept: PHYSICAL THERAPY | Age: 63
Discharge: HOME | End: 2018-10-05
Payer: MEDICARE

## 2018-10-05 PROCEDURE — 97140 MANUAL THERAPY 1/> REGIONS: CPT

## 2018-10-05 PROCEDURE — 97110 THERAPEUTIC EXERCISES: CPT

## 2018-10-05 NOTE — PROGRESS NOTES
PT DAILY TREATMENT NOTE - [facility]  Patient Name: Hong Joseph Date:10/5/2018 : 1955 [x]  Patient  Verified Payor: [place] MED[place] / Plan: Duarte Beckham / Product Type: Medicare / In time:11:00  Out time:11:46 Total Treatment Time (min): 46 Total Timed Codes (min): 36 
1:1 Treatment Time ( only): 36 Visit #: 5 of 16 Treatment Area: Left shoulder pain [M25.512] Left shoulder tendinitis [M75.82] SUBJECTIVE Pain Level (0-10 scale): 4/10 Any medication changes, allergies to medications, adverse drug reactions, diagnosis change, or new procedure performed?: [x] No    [] Yes (see summary sheet for update) Subjective functional status/changes:   [] No changes reported \"This morning I feel much better. \" OBJECTIVE Modality rationale: decrease pain to improve the patients ability to perform ADL's. Min Type Additional Details  
 [] Estim:  []Unatt       []IFC  []Premod []Other:  []w/ice   []w/heat Position: Location:  
 [] Estim: []Att    []TENS instruct  []NMES []Other:  []w/US   []w/ice   []w/heat Position: Location:  
 []  Traction: [] Cervical       []Lumbar 
                     [] Prone          []Supine []Intermittent   []Continuous Lbs: 
[] before manual 
[] after manual  
 []  Ultrasound: []Continuous   [] Pulsed []1MHz   []3MHz W/cm2: 
Location:  
 []  Iontophoresis with dexamethasone Location: [] Take home patch  
[] In clinic  
10 [x]  Ice     []  heat 
[]  Ice massage  
[]  Laser  
[]  Anodyne Position: Seated Location: Left Shoulder  
 []  Laser with stim 
[]  Other:  Position: Location:  
 []  Vasopneumatic Device Pressure:       [] lo [] med [] hi  
Temperature: [] lo [] med [] hi  
[] Skin assessment post-treatment:  []intact []redness- no adverse reaction 
  []redness  adverse reaction:  
 
28 min Therapeutic Exercise:  [x] See flow sheet :  
 Rationale: increase ROM, increase strength and increase proprioception to improve the patients ability to perform ADL's. 
 
8 min Manual Therapy:  Left ST/GH joint mobs, Shoulder PROM. DTM Left UT, lev scap, infraspinatus, rhomboids, lats. Rationale: decrease pain, increase ROM, increase tissue extensibility and decrease trigger points to improve ease of OH activities. With 
 [x] TE 
 [] TA 
 [] neuro 
 [] other: Patient Education: [x] Review HEP [] Progressed/Changed HEP based on:  
[] positioning   [] body mechanics   [] transfers   [] heat/ice application   
[] other:   
 
Other Objective/Functional Measures: Added standing wand ER, IR and extension 10x each. AROM Left shoulder flexion 153 degrees. Pain Level (0-10 scale) post treatment: 0/10 ASSESSMENT/Changes in Function: FOTO 41%. FOTO decreased however pt verbally reports improve ease with ADL's. Patient will continue to benefit from skilled PT services to modify and progress therapeutic interventions, address functional mobility deficits, address ROM deficits, address strength deficits, analyze and address soft tissue restrictions and analyze and cue movement patterns to attain remaining goals. [x]  See Plan of Care 
[]  See progress note/recertification 
[]  See Discharge Summary Progress towards goals / Updated goals: 
Short Term Goals: To be accomplished in 2 weeks: 
                      0. Patient will report performance of HEP at least 2 times per day to facilitate improved outcomes and improved self management. - Met per patient report. 9/25/18 
                      2. Pt will demonstrate L shoulder PROM ABD to 120, ER @ 90 to 65, and IR @ 90 to 45 or better to indicate improving GHJ mobility and improve ease of personal grooming activities - PROM Left shoulder flexion 165, abd 140, ER 65 @ 65, IR 45 @ 90. 10/2/2018 Long Term Goals: To be accomplished in 8 weeks:                       7. Patient will report FOTO score of 67 or better to indicate significant improvement in functional status. - FOTO 41%.                       7. Pt will demonstrate L shoulder AROM flexion to 145 or better to improve ease of OH reach. - AROM Left shoulder flexion 153 degrees. 10/5/2018 
                      3. Pt will demonstrate L shoulder PROM to , ER @ 90 to 80, and IR @ 90 to 60 or better to improve capsular mobility and facilitate return to PLOF.                       0. Pt will demonstrate L shoulder strength 5/5 to improve ease of OH reach. PLAN 
[]  Upgrade activities as tolerated     [x]  Continue plan of care 
[]  Update interventions per flow sheet      
[]  Discharge due to:_ 
[]  Other:_   
 
Bobbi Montgomery, KULWANT 10/5/2018  11:01 AM 
 
Future Appointments Date Time Provider Kristi Rich 10/9/2018 11:00 AM Bobbi Montgomery PTA [facility]  
10/12/2018 11:00 AM Noemy Castro PTA [facility]  
10/16/2018 11:00 AM PAU Brown [facility]  
10/18/2018 10:15 AM Ruba Brewster MD [address] [place] [address]

## 2018-10-09 ENCOUNTER — HOSPITAL ENCOUNTER (OUTPATIENT)
Dept: PHYSICAL THERAPY | Age: 63
Discharge: HOME | End: 2018-10-09
Payer: MEDICARE

## 2018-10-09 PROCEDURE — 97140 MANUAL THERAPY 1/> REGIONS: CPT

## 2018-10-09 PROCEDURE — 97110 THERAPEUTIC EXERCISES: CPT

## 2018-10-09 NOTE — PROGRESS NOTES
PT DAILY TREATMENT NOTE - [facility]  Patient Name: Klaudia Grubbs Date:10/9/2018 : 1955 [x]  Patient  Verified Payor: [place] MEDICARE / Plan: Duarte Beckham / Product Type: Medicare / In time:11:01  Out time:11:42 Total Treatment Time (min): 41 Total Timed Codes (min): 41 
1:1 Treatment Time ( [address] only): 41 Visit #: 6 of 16 Treatment Area: Left shoulder pain [M25.512] Left shoulder tendinitis [M75.82] SUBJECTIVE Pain Level (0-10 scale): 0/10 Any medication changes, allergies to medications, adverse drug reactions, diagnosis change, or new procedure performed?: [x] No    [] Yes (see summary sheet for update) Subjective functional status/changes:   [] No changes reported \"No pain, a little sore. \" OBJECTIVE 33 min Therapeutic Exercise:  [x] See flow sheet :  
Rationale: increase ROM, increase strength and increase proprioception to improve the patients ability to perform ADL's. 
 
8 min Manual Therapy:  Left ST/GH joint mobs, DTM/TPR Left deltoid, UT, pec minor/major, infraspinatus. Shoulder PROM. Rationale: decrease pain, increase ROM, increase tissue extensibility and decrease trigger points to improve activity tolerance. With 
 [x] TE 
 [] TA 
 [] neuro 
 [] other: Patient Education: [x] Review HEP [] Progressed/Changed HEP based on:  
[] positioning   [] body mechanics   [] transfers   [] heat/ice application   
[] other:   
 
Other Objective/Functional Measures: Added lift off with wall slides. Added t-band rows, ext, horizontal abd, IR and ER 10x each. Pain Level (0-10 scale) post treatment: 0/10 ASSESSMENT/Changes in Function: ROM has improved since IE, added strengthening exercises to routine. Performed new exercises well, discomfort with eccentric phase of T-band ER.  
 
Patient will continue to benefit from skilled PT services to modify and progress therapeutic interventions, address functional mobility deficits, address ROM deficits, address strength deficits, analyze and address soft tissue restrictions and analyze and cue movement patterns to attain remaining goals. [x]  See Plan of Care 
[]  See progress note/recertification 
[]  See Discharge Summary Progress towards goals / Updated goals: 
Short Term Goals: To be accomplished in 2 weeks: 
                      8. Patient will report performance of HEP at least 2 times per day to facilitate improved outcomes and improved self management. - Met per patient report. 9/25/18 
                      2. Pt will demonstrate L shoulder PROM ABD to 120, ER @ 90 to 65, and IR @ 90 to 45 or better to indicate improving GHJ mobility and improve ease of personal grooming activities - PROM Left shoulder flexion 165, abd 140, ER 65 @ 65, IR 45 @ 90. 10/2/2018 Long Term Goals: To be accomplished in 8 weeks:  
                      5. Patient will report FOTO score of 67 or better to indicate significant improvement in functional status. - FOTO 41%.                       4. Pt will demonstrate L shoulder AROM flexion to 145 or better to improve ease of OH reach. - AROM Left shoulder flexion 153 degrees. 10/5/2018 
                      3. Pt will demonstrate L shoulder PROM to , ER @ 90 to 80, and IR @ 90 to 60 or better to improve capsular mobility and facilitate return to PLOF.                       3. Pt will demonstrate L shoulder strength 5/5 to improve ease of OH reach. PLAN 
[]  Upgrade activities as tolerated     [x]  Continue plan of care 
[]  Update interventions per flow sheet      
[]  Discharge due to:_ 
[]  Other:_   
 
Frank Reardon PTA 10/9/2018  11:07 AM 
 
Future Appointments Date Time Provider Kristi Rich 10/12/2018 11:00 AM Viet Gonzalez PTA [facility]  
10/16/2018 11:00 AM PAU [facility]  
10/18/2018 10:15 AM Edinson Carbone MD [address] [place] [address]

## 2018-10-12 ENCOUNTER — HOSPITAL ENCOUNTER (OUTPATIENT)
Dept: PHYSICAL THERAPY | Age: 63
Discharge: HOME | End: 2018-10-12
Payer: MEDICARE

## 2018-10-12 PROCEDURE — 97110 THERAPEUTIC EXERCISES: CPT

## 2018-10-12 PROCEDURE — 97140 MANUAL THERAPY 1/> REGIONS: CPT

## 2018-10-12 NOTE — PROGRESS NOTES
PT DAILY TREATMENT NOTE - [facility]  Patient Name: Cristin Wheeler Date:10/12/2018 : 1955 [x]  Patient  Verified Payor: [place] MEDICARE / Plan: Duarte Berry / Product Type: Medicare / In time:11:00  Out time:11:52 Total Treatment Time (min): 52 Total Timed Codes (min): 42 
1:1 Treatment Time ( only): 38 Visit #: 7 of 16 Treatment Area: Left shoulder pain [M25.512] Left shoulder tendinitis [M75.82] SUBJECTIVE Pain Level (0-10 scale): 4-5/10 Any medication changes, allergies to medications, adverse drug reactions, diagnosis change, or new procedure performed?: [x] No    [] Yes (see summary sheet for update) Subjective functional status/changes:   [] No changes reported Pt reports continued c/o pain in left shoulder OBJECTIVE Modality rationale: decrease inflammation and decrease pain to improve the patients ability to tolerate ADLs. Min Type Additional Details  
 [] Estim:  []Unatt       []IFC  []Premod []Other:  []w/ice   []w/heat Position: Location:  
 [] Estim: []Att    []TENS instruct  []NMES []Other:  []w/US   []w/ice   []w/heat Position: Location:  
 []  Traction: [] Cervical       []Lumbar 
                     [] Prone          []Supine []Intermittent   []Continuous Lbs: 
[] before manual 
[] after manual  
 []  Ultrasound: []Continuous   [] Pulsed []1MHz   []3MHz W/cm2: 
Location:  
 []  Iontophoresis with dexamethasone Location: [] Take home patch  
[] In clinic  
10 [x]  Ice     []  heat 
[]  Ice massage 
[]  Laser  
[]  Anodyne Position: supine Location:left shoulder  
 []  Laser with stim 
[]  Other:  Position: Location:  
 []  Vasopneumatic Device Pressure:       [] lo [] med [] hi  
Temperature: [] lo [] med [] hi  
[] Skin assessment post-treatment:  []intact []redness- no adverse reaction 
  []redness  adverse reaction: 34 min Therapeutic Exercise:  [x] See flow sheet :  
Rationale: increase ROM and increase strength to improve the patients ability to tolerate ADLs. 8 min Manual Therapy:  PROM to left shoulder all planes. STJ mobs Rationale: decrease pain, increase ROM and increase tissue extensibility to improve tolerance to functional activities. With 
 [] TE 
 [] TA 
 [] neuro 
 [] other: Patient Education: [x] Review HEP [] Progressed/Changed HEP based on:  
[] positioning   [] body mechanics   [] transfers   [] heat/ice application   
[] other:   
 
Other Objective/Functional Measures: pain with end range IR/ER. Pt has near full PROM shoulder flexion/abduction. Pain Level (0-10 scale) post treatment: 0/10 ASSESSMENT/Changes in Function: Pt demonstrates improved left shoulder mobility but has continued limitations through IR and ER. Plan to continue PT for 3 weeks. Patient will continue to benefit from skilled PT services to modify and progress therapeutic interventions, address functional mobility deficits, address ROM deficits, address strength deficits and analyze and address soft tissue restrictions to attain remaining goals. []  See Plan of Care 
[]  See progress note/recertification 
[]  See Discharge Summary Progress towards goals / Updated goals: 
Short Term Goals: To be accomplished in 2 weeks: 
                      5. Patient will report performance of HEP at least 2 times per day to facilitate improved outcomes and improved self management. - Met per patient report. 9/25/18 
                      2. Pt will demonstrate L shoulder PROM ABD to 120, ER @ 90 to 65, and IR @ 90 to 45 or better to indicate improving GHJ mobility and improve ease of personal grooming activities - PROM Left shoulder flexion 165, abd 140, ER 65 @ 65, IR 45 @ 90. 10/2/2018 Long Term Goals: To be accomplished in 8 weeks:  
                      8.  Patient will report FOTO score of 67 or better to indicate significant improvement in functional status. - FOTO 41%.                       7. Pt will demonstrate L shoulder AROM flexion to 145 or better to improve ease of OH reach. - AROM Left shoulder flexion 153 degrees. 10/5/2018 
                      3. Pt will demonstrate L shoulder PROM to , ER @ 90 to 80, and IR @ 90 to 60 or better to improve capsular mobility and facilitate return to PLOF.                       9. Pt will demonstrate L shoulder strength 5/5 to improve ease of OH reach. PLAN 
[]  Upgrade activities as tolerated     [x]  Continue plan of care 
[]  Update interventions per flow sheet      
[]  Discharge due to:_ 
[]  Other:_ Ángel Valentino, PTA 10/12/2018  11:19 AM 
 
Future Appointments Date Time Provider Kristi Rich 10/16/2018 10:30 AM Micheline Henao [id number]  
10/18/2018 10:15 AM Claudia Llanes MD [address] [place] [address]

## 2018-10-16 ENCOUNTER — HOSPITAL ENCOUNTER (OUTPATIENT)
Dept: PHYSICAL THERAPY | Age: 63
Discharge: HOME | End: 2018-10-16
Payer: MEDICARE

## 2018-10-16 PROCEDURE — G8985 CARRY GOAL STATUS: HCPCS

## 2018-10-16 PROCEDURE — 97110 THERAPEUTIC EXERCISES: CPT

## 2018-10-16 PROCEDURE — G8984 CARRY CURRENT STATUS: HCPCS

## 2018-10-16 PROCEDURE — 97140 MANUAL THERAPY 1/> REGIONS: CPT

## 2018-10-16 NOTE — PROGRESS NOTES
In [address] [place] [address] [facility], [address] 
[phone] fax Medicare Progress Report Patient name: Isaac Padilla Start of Care: 2018 Referral source: Cecily Alva MD : 1955 Medical/Treatment Diagnosis: Left shoulder pain [M25.512] Left shoulder tendinitis [M75.82] Onset Date: 5-6 months ago Prior Hospitalization: see medical history Provider#: [id number] Medications: Verified on Patient Summary List   
Comorbidities: unremarkable per pt report Prior Level of Function: walking 3 days/ week for an hour, occasional swimming and other exercise Visits from Start of Care: 8    Missed Visits: 0 Reporting Period: 18 to 10/16/18 Subjective Reports:  Patient stated that her shoulder has gotten much better, and she doesn't have as much pain with shoulder flexion, but still has discomfort with abduction and IR. Patient average pain level  Is now a 3-4/10, and prior to PT was 7/10. 
  
Progress towards goals / Updated goals: 
Short Term Goals: To be accomplished in 2 weeks: 
                      6. Patient will report performance of HEP at least 2 times per day to facilitate improved outcomes and improved self management. - Met per patient report. 18 
                      2. Pt will demonstrate L shoulder PROM ABD to 120, ER @ 90 to 65, and IR @ 90 to 45 or better to indicate improving GHJ mobility and improve ease of personal grooming activities - PROM Left shoulder flexion 165, abd 140, ER 65 @ 65, IR 45 @ 90. 10/2/2018 Long Term Goals: To be accomplished in 8 weeks:  
                      4. Patient will report FOTO score of 67 or better to indicate significant improvement in functional status. - FOTO 41%.                       4. Pt will demonstrate L shoulder AROM flexion to 145 or better to improve ease of OH reach. - AROM Left shoulder flexion 155 degrees. 10/16/2018                       3. Pt will demonstrate L shoulder PROM to , ER @ 90 to 80, and IR @ 90 to 60 or better to improve capsular mobility and facilitate return to PLOF. PROM: ER at 90dedeg; IR (@90deg) 70deg; Scap: WFL; Flex: WFL. Tightness noted at end feel into IR and ER. (10/16/18)                       0. Pt will demonstrate L shoulder strength 5/5 to improve ease of OH reach. Not assessed Key functional changes:  Patient has made steady progress in PT with improved left shoulder AROM/PROM, and reduction in pain. Patient is able to perform left shoulder flexion and scaption to [facility] without [place] compensation. Problems/ barriers to goal attainment: none Assessment / Recommendations:Patient will continue to benefit from skilled PT services to modify and progress therapeutic interventions, address functional mobility deficits, address ROM deficits, address strength deficits, analyze and address soft tissue restrictions, analyze and cue movement patterns and assess and modify postural abnormalities to attain remaining goals. Problem List: pain affecting function, decrease ROM, decrease strength, decrease ADL/ functional abilitiies, decrease activity tolerance, decrease flexibility/ joint mobility and decrease transfer abilities Treatment Plan: Therapeutic exercise, Therapeutic activities, Neuromuscular re-education, Physical agent/modality, Manual therapy, Patient education and Functional mobility training Patient Goal (s) has been updated and includes:Con't with previous goal.  
 
Updated Goals to be accomplished in 2 weeks: 
 1. Patient will report FOTO score of 67 or better to indicate significant improvement in functional status. 2. Pt will demonstrate L shoulder strength 5/5 to improve ease of OH reach. 3. Patient will improve Left shoulder IR behind back equivalent to right to increase ease with dressing. Frequency / Duration: Patient to be seen 2 times per week for 2 weeks: G-Codes (GP) Carry  Current  CK= 40-59%  Goal  CJ= 20-39% The severity rating is based on clinical judgment and the FOTO score.  
 
James Vincent, PT 10/16/2018 11:23 AM

## 2018-10-16 NOTE — PROGRESS NOTES
PT DAILY TREATMENT NOTE - [facility]  Patient Name: Jacquie England Date:10/16/2018 : 1955 [x]  Patient  Verified Payor: [place] MEDICARE / Plan: Duarte Kinsey y / Product Type: Medicare / In time: 10:30  Out time:11:17 Total Treatment Time (min): 47 Total Timed Codes (min): 47 
1:1 Treatment Time ( only): 36 Visit #: 8 of 16 Treatment Area: Left shoulder pain [M25.512] Left shoulder tendinitis [M75.82] SUBJECTIVE Pain Level (0-10 scale): 3-4 Any medication changes, allergies to medications, adverse drug reactions, diagnosis change, or new procedure performed?: [x] No    [] Yes (see summary sheet for update) Subjective functional status/changes:   [] No changes reported Patient stated that her shoulder has gotten much better, and she doesn't have as much pain with shoulder flexion, but still has discomfort with abduction and IR. Patient average pain level  Is now a 3-4/10, and prior to PT was 7/10. OBJECTIVE Modality rationale: PD Min Type Additional Details  
 [] Estim:  []Unatt       []IFC  []Premod []Other:  []w/ice   []w/heat Position: Location:  
 [] Estim: []Att    []TENS instruct  []NMES []Other:  []w/US   []w/ice   []w/heat Position: Location:  
 []  Traction: [] Cervical       []Lumbar 
                     [] Prone          []Supine []Intermittent   []Continuous Lbs: 
[] before manual 
[] after manual  
 []  Ultrasound: []Continuous   [] Pulsed []1MHz   []3MHz W/cm2: 
Location:  
 []  Iontophoresis with dexamethasone Location: [] Take home patch  
[] In clinic  
 []  Ice     []  heat 
[]  Ice massage 
[]  Laser  
[]  Anodyne Position: Location:  
 []  Laser with stim 
[]  Other:  Position: Location:  
 []  Vasopneumatic Device Pressure:       [] lo [] med [] hi  
Temperature: [] lo [] med [] hi  
[] Skin assessment post-treatment:  []intact []redness- no adverse reaction 
  []redness  adverse reaction:  
 
37 min Therapeutic Exercise:  [x] See flow sheet :  
Rationale: increase ROM and increase strength to improve the patients ability to perform ADls. 10 min Manual Therapy:  Left shoulder PROM, subscap release. Rationale: decrease pain, increase ROM and increase tissue extensibility to increase ease with ADls. With 
 [] TE 
 [] TA 
 [] neuro 
 [] other: Patient Education: [x] Review HEP [] Progressed/Changed HEP based on:  
[] positioning   [] body mechanics   [] transfers   [] heat/ice application   
[] other:   
 
Other Objective/Functional Measures:  
IR behind back: Right: T7, Left: L4 
PROM: ER at 90dedeg; IR (@90deg) 70deg; Scap: WFL; Flex: WFL. Tightness noted at end feel into IR and ER. Left shoulder AROM: flex: 155deg Pain Level (0-10 scale) post treatment: 1-2/10 ASSESSMENT/Changes in Function: Patient has made steady progress in PT with improved left shoulder AROM/PROM, and reduction in pain. Patient is able to perform left shoulder flexion and scaption to [facility] without [place] compensation. Patient did well with the addition of wall pushups and denied any pain. Patient reported decreased pain at end of the session. Patient will continue to benefit from skilled PT services to modify and progress therapeutic interventions, address functional mobility deficits, address ROM deficits, address strength deficits, analyze and address soft tissue restrictions, analyze and cue movement patterns and assess and modify postural abnormalities to attain remaining goals. []  See Plan of Care [x]  See progress note/recertification 
[]  See Discharge Summary Progress towards goals / Updated goals: 
Short Term Goals: To be accomplished in 2 weeks: 
                      4. Patient will report performance of HEP at least 2 times per day to facilitate improved outcomes and improved self management. - Met per patient report. 18                       0. Pt will demonstrate L shoulder PROM ABD to 120, ER @ 90 to 65, and IR @ 90 to 45 or better to indicate improving GHJ mobility and improve ease of personal grooming activities - PROM Left shoulder flexion 165, abd 140, ER 65 @ 65, IR 45 @ 90. 10/2/2018 Long Term Goals: To be accomplished in 8 weeks:  
                      3. Patient will report FOTO score of 67 or better to indicate significant improvement in functional status. - FOTO 41%.                       2. Pt will demonstrate L shoulder AROM flexion to 145 or better to improve ease of OH reach. - AROM Left shoulder flexion 155 degrees. 10/16/2018 
                      3. Pt will demonstrate L shoulder PROM to , ER @ 90 to 80, and IR @ 90 to 60 or better to improve capsular mobility and facilitate return to PLOF. PROM: ER at 90dedeg; IR (@90deg) 70deg; Scap: WFL; Flex: WFL. Tightness noted at end feel into IR and ER. (10/16/18)                       8. Pt will demonstrate L shoulder strength 5/5 to improve ease of OH reach. Not assessed PLAN 
[]  Upgrade activities as tolerated     [x]  Continue plan of care 
[]  Update interventions per flow sheet      
[]  Discharge due to:_ 
[]  Other:_   
 
Danielle Marrero, PT 10/16/2018  10:39 AM 
 
Future Appointments Date Time Provider Kristi Rich 10/18/2018 10:15 AM Chanel Harding MD [address] [place] [address]

## 2018-10-18 ENCOUNTER — OFFICE VISIT (OUTPATIENT)
Dept: UROLOGY | Age: 63
End: 2018-10-18

## 2018-10-18 ENCOUNTER — HOSPITAL ENCOUNTER (OUTPATIENT)
Dept: PHYSICAL THERAPY | Age: 63
Discharge: HOME | End: 2018-10-18
Payer: MEDICARE

## 2018-10-18 VITALS
HEART RATE: 70 BPM | WEIGHT: 110 LBS | BODY MASS INDEX: 21.6 KG/M2 | DIASTOLIC BLOOD PRESSURE: 75 MMHG | SYSTOLIC BLOOD PRESSURE: 118 MMHG | HEIGHT: 60 IN | OXYGEN SATURATION: 97 %

## 2018-10-18 DIAGNOSIS — N39.0 RECURRENT UTI: Primary | ICD-10-CM

## 2018-10-18 DIAGNOSIS — N95.2 ATROPHIC VAGINITIS: ICD-10-CM

## 2018-10-18 LAB
BILIRUB UR QL STRIP: NEGATIVE
GLUCOSE UR-MCNC: NEGATIVE MG/DL
KETONES P FAST UR STRIP-MCNC: NEGATIVE MG/DL
PH UR STRIP: 5.5 [PH] (ref 4.6–8)
PROT UR QL STRIP: NEGATIVE
SP GR UR STRIP: 1 (ref 1–1.03)
UA UROBILINOGEN AMB POC: NORMAL (ref 0.2–1)
URINALYSIS CLARITY POC: CLEAR
URINALYSIS COLOR POC: YELLOW
URINE BLOOD POC: NEGATIVE
URINE LEUKOCYTES POC: NEGATIVE
URINE NITRITES POC: NEGATIVE

## 2018-10-18 PROCEDURE — 97140 MANUAL THERAPY 1/> REGIONS: CPT

## 2018-10-18 NOTE — PROGRESS NOTES
PT DAILY TREATMENT NOTE - [facility]  Patient Name: Jose Linn Date:10/18/2018 : 1955 [x]  Patient  Verified Payor: [place] / Plan: Duarte Kinsey y / Product Type: Medicare / In time: 3:39 Out time:4:27 Total Treatment Time (min): 48 Total Timed Codes (min): 48 
1:1 Treatment Time ( [address] only): 20 Visit #: 9 of 16 Treatment Area: Left shoulder pain [M25.512] Left shoulder tendinitis [M75.82] SUBJECTIVE Pain Level (0-10 scale): 3/10 Any medication changes, allergies to medications, adverse drug reactions, diagnosis change, or new procedure performed?: [x] No    [] Yes (see summary sheet for update) Subjective functional status/changes:   [] No changes reported Patient stated that she felt fine after doing the new exercises last session. OBJECTIVE 30 min Therapeutic Exercise:  [x] See flow sheet :  
Rationale: increase ROM and increase strength to improve the patients ability to perform ADLs. 10 min Neuromuscular Re-education:  [x]  See flow sheet : scapular stabs Rationale: increase strength, improve coordination and increase proprioception  to improve the patients ability to reach overhead with proper mechanics 8 min Manual Therapy:  STM to left mid deltoid, Subscap release, PROM Rationale: decrease pain, increase ROM and increase tissue extensibility to increase ease with ADLs. With 
 [] TE 
 [] TA 
 [] neuro 
 [] other: Patient Education: [x] Review HEP [] Progressed/Changed HEP based on:  
[] positioning   [] body mechanics   [] transfers   [] heat/ice application   
[] other:   
 
Other Objective/Functional Measures:  
Decreased scapular strength noted with exercises. TTP just distal to left acromion Pain Level (0-10 scale) post treatment: 0/10 ASSESSMENT/Changes in Function: Patient was challenged with 1/2 prone letters, and had increased discomfort with \"Y\" position so had patient stop after a few reps. Patient reported decreased pain at end of the session. Patient will continue to benefit from skilled PT services to modify and progress therapeutic interventions, address functional mobility deficits, address ROM deficits, address strength deficits, analyze and address soft tissue restrictions, analyze and cue movement patterns and assess and modify postural abnormalities to attain remaining goals. []  See Plan of Care 
[]  See progress note/recertification 
[]  See Discharge Summary Progress towards goals / Updated goals: 
Updated Goals to be accomplished in 2 weeks: 
 1. Patient will report FOTO score of 67 or better to indicate significant improvement in functional status. 2. Pt will demonstrate L shoulder strength 5/5 to improve ease of OH reach.  
3. Patient will improve Left shoulder IR behind back equivalent to right to increase ease with dressing. PLAN 
[]  Upgrade activities as tolerated     [x]  Continue plan of care 
[]  Update interventions per flow sheet      
[]  Discharge due to:_ 
[]  Other:_   
 
De Thomas, PT 10/18/2018  6:13 PM 
 
Future Appointments Date Time Provider Kristi Rich 10/22/2018  3:30 PM Lo Hurst PTA [facility]  
10/26/2018 11:00 AM Lo Hurst PTA [facility]  
4/25/2019 10:00 AM Teresa Hernandez MD [address] [place] [address]

## 2018-10-18 NOTE — PATIENT INSTRUCTIONS
Urinary Tract Infection in Women: Care Instructions  Your Care Instructions    A urinary tract infection, or UTI, is a general term for an infection anywhere between the kidneys and the urethra (where urine comes out). Most UTIs are bladder infections. They often cause pain or burning when you urinate. UTIs are caused by bacteria and can be cured with antibiotics. Be sure to complete your treatment so that the infection goes away. Follow-up care is a key part of your treatment and safety. Be sure to make and go to all appointments, and call your doctor if you are having problems. It's also a good idea to know your test results and keep a list of the medicines you take. How can you care for yourself at home? · Take your antibiotics as directed. Do not stop taking them just because you feel better. You need to take the full course of antibiotics. · Drink extra water and other fluids for the next day or two. This may help wash out the bacteria that are causing the infection. (If you have kidney, heart, or liver disease and have to limit fluids, talk with your doctor before you increase your fluid intake.)  · Avoid drinks that are carbonated or have caffeine. They can irritate the bladder. · Urinate often. Try to empty your bladder each time. · To relieve pain, take a hot bath or lay a heating pad set on low over your lower belly or genital area. Never go to sleep with a heating pad in place. To prevent UTIs  · Drink plenty of water each day. This helps you urinate often, which clears bacteria from your system. (If you have kidney, heart, or liver disease and have to limit fluids, talk with your doctor before you increase your fluid intake.)  · Urinate when you need to. · Urinate right after you have sex. · Change sanitary pads often. · Avoid douches, bubble baths, feminine hygiene sprays, and other feminine hygiene products that have deodorants.   · After going to the bathroom, wipe from front to back.  When should you call for help? Call your doctor now or seek immediate medical care if:    · Symptoms such as fever, chills, nausea, or vomiting get worse or appear for the first time.     · You have new pain in your back just below your rib cage. This is called flank pain.     · There is new blood or pus in your urine.     · You have any problems with your antibiotic medicine.    Watch closely for changes in your health, and be sure to contact your doctor if:    · You are not getting better after taking an antibiotic for 2 days.     · Your symptoms go away but then come back. Where can you learn more? Go to http://елена-mariela.info/. Enter E981 in the search box to learn more about \"Urinary Tract Infection in Women: Care Instructions. \"  Current as of: March 21, 2018  Content Version: 11.8  © 0345-8740 [employer]. Care instructions adapted under license by [employer] (which disclaims liability or warranty for this information). If you have questions about a medical condition or this instruction, always ask your healthcare professional. [address] any warranty or liability for your use of this information.

## 2018-10-18 NOTE — PROGRESS NOTES
Ms. Julieth Bernard has a reminder for a \"due or due soon\" health maintenance. I have asked that she contact her primary care provider for follow-up on this health maintenance.

## 2018-10-18 NOTE — PROGRESS NOTES
Chief Complaint   Patient presents with    Recurrent UTI       HISTORY OF PRESENT ILLNESS:  Rosie Campos is a 70 y.o. female comes into the office following a trial of Estrace cream for recurrent urinary infections. She has been doing extremely well while she is on this but is having some hot flashes. She has seen her primary care physician who added a progesterone component because she still has her uterus but she still having significant hot flashes. I think it is perfectly appropriate to continue the small amount of Estrace cream she is using as long as she has a little bit of counter active progesterone to keep the lining of the uterus from over stimulation. She is very happy with the result of not having infections and I have recommended also that she may want to discuss this further with her gynecologist.  At any rate I would like for her to continue the Estrace cream and I have recommended she perhaps cut it down to 1 time weekly rather than the twice weekly regimen she is on. I will see her back in 6 months. Past Medical History:   Diagnosis Date    Urinary frequency        Past Surgical History:   Procedure Laterality Date    HX DILATION AND CURETTAGE  01/2018       Social History     Tobacco Use    Smoking status: Never Smoker    Smokeless tobacco: Never Used   Substance Use Topics    Alcohol use: No    Drug use: No       No Known Allergies    History reviewed. No pertinent family history. Current Outpatient Medications   Medication Sig Dispense Refill    ibandronate (BONIVA) 150 mg tablet Take 150 mg by mouth every thirty (30) days.  estradiol (ESTRACE) 0.01 % (0.1 mg/gram) vaginal cream Apply 0.5 grams with fingertip to urethral opening twice weekly 42.5 g 0    meth/meblue/sod phos/psal/hyos (URIBEL PO) Take  by mouth.  phenazopyridine (PYRIDIUM) 200 mg tablet Take  by mouth three (3) times daily as needed for Pain.              REVIEW OF SYSTEMS:   Documented on the chart      PHYSICAL EXAMINATION:     Visit Vitals  /75 (BP 1 Location: Left arm, BP Patient Position: Sitting)   Pulse 70   Ht 5' (1.524 m)   Wt 110 lb (49.9 kg)   SpO2 97%   BMI 21.48 kg/m²     Constitutional: Well developed, well-nourished female in no acute distress. CV:  No peripheral swelling noted  Respiratory: No respiratory distress or difficulties  Abdomen:  Soft and nontender. No masses. No hepatosplenomegaly.  Female: A pelvic exam is not repeated today. Skin:  Normal color. No evidence of jaundice. Neuro/Psych:  Patient with appropriate affect. Alert and oriented. Lymphatic:   No enlargement of supraclavicular lymph nodes. Results for orders placed or performed in visit on 10/18/18   AMB POC URINALYSIS DIP STICK AUTO W/O MICRO   Result Value Ref Range    Color (UA POC) Yellow     Clarity (UA POC) Clear     Glucose (UA POC) Negative Negative    Bilirubin (UA POC) Negative Negative    Ketones (UA POC) Negative Negative    Specific gravity (UA POC) 1.005 1.001 - 1.035    Blood (UA POC) Negative Negative    pH (UA POC) 5.5 4.6 - 8.0    Protein (UA POC) Negative Negative    Urobilinogen (UA POC) 0.2 mg/dL 0.2 - 1    Nitrites (UA POC) Negative Negative    Leukocyte esterase (UA POC) Negative Negative         REVIEW OF LABS AND IMAGING:      Imaging Report Reviewed? No     Images Reviewed? No           Other Lab Data Reviewed? YES    ASSESSMENT:     ICD-10-CM ICD-9-CM    1. Recurrent UTI N39.0 599.0 AMB POC URINALYSIS DIP STICK AUTO W/O MICRO   2. Atrophic vaginitis N95.2 627.3                 PLAN/DISCUSSION: I think she is done very well with the Estrace cream but because she has uterus she is going to need some progesterone to counteract the effect on the uterine lining. She is still having some significant hot flashes from the Prempro that has been prescribed.   I did tell her I think she needed the progesterone but I would prefer additional help from her gynecologist and her primary care physician. Otherwise I will see her back in 6 months. Patient voices understanding and agreement to the plan. Joe Marvin MD on 10/18/2018           Please note: This document has been produced using voice recognition software. Unrecognized errors in transcription may be present.

## 2018-10-22 ENCOUNTER — HOSPITAL ENCOUNTER (OUTPATIENT)
Dept: PHYSICAL THERAPY | Age: 63
Discharge: HOME | End: 2018-10-22
Payer: MEDICARE

## 2018-10-22 PROCEDURE — 97110 THERAPEUTIC EXERCISES: CPT

## 2018-10-22 PROCEDURE — 97140 MANUAL THERAPY 1/> REGIONS: CPT

## 2018-10-22 PROCEDURE — G8985 CARRY GOAL STATUS: HCPCS

## 2018-10-22 PROCEDURE — G8986 CARRY D/C STATUS: HCPCS

## 2018-10-22 NOTE — PROGRESS NOTES
PT DAILY TREATMENT NOTE 10-18 Patient Name: Hong Joseph Date:10/22/2018 : 1955 [x]  Patient  Verified Payor: [place] MEDICARE / Plan: Duarte Kinsey y / Product Type: Medicare / In time:3:30  Out time:4:15 Total Treatment Time (min): 45 Visit #: 1 of 4 Medicare/BCBS Only Total Timed Codes (min):  35 1:1 Treatment Time:  30 Treatment Area: Left shoulder pain [M25.512] Left shoulder tendinitis [M75.82] SUBJECTIVE Pain Level (0-10 scale): 310 Any medication changes, allergies to medications, adverse drug reactions, diagnosis change, or new procedure performed?: [x] No    [] Yes (see summary sheet for update) Subjective functional status/changes:   [] No changes reported Pt reports her shoulder feels better and she wants today to be her last day. OBJECTIVE Modality rationale: decrease inflammation and decrease pain to improve the patients ability to tolerate ADLs. Min Type Additional Details  
 [] Estim:  []Unatt       []IFC  []Premod []Other:  []w/ice   []w/heat Position: Location:  
 [] Estim: []Att    []TENS instruct  []NMES []Other:  []w/US   []w/ice   []w/heat Position: Location:  
 []  Traction: [] Cervical       []Lumbar 
                     [] Prone          []Supine []Intermittent   []Continuous Lbs: 
[] before manual 
[] after manual  
 []  Ultrasound: []Continuous   [] Pulsed []1MHz   []3MHz W/cm2: 
Location:  
 []  Iontophoresis with dexamethasone Location: [] Take home patch  
[] In clinic  
10 [x]  Ice     []  heat 
[]  Ice massage 
[]  Laser  
[]  Anodyne Position:supine Location: left shoulder  
 []  Laser with stim 
[]  Other:  Position: Location:  
 []  Vasopneumatic Device Pressure:       [] lo [] med [] hi  
Temperature: [] lo [] med [] hi  
[] Skin assessment post-treatment:  []intact []redness- no adverse reaction []redness  adverse reaction:  
 
27 min Therapeutic Exercise:  [x] See flow sheet :  
Rationale: increase ROM and increase strength to improve the patients ability to tolerate ADLs. 8 min Manual Therapy:  PROM to left shoulder, STM to left UT and pec Rationale: decrease pain, increase ROM and increase tissue extensibility to improve tolerance to ADLs. With 
 [] TE 
 [] TA 
 [] neuro 
 [] other: Patient Education: [x] Review HEP [] Progressed/Changed HEP based on:  
[] positioning   [] body mechanics   [] transfers   [] heat/ice application   
[] other:   
 
Other Objective/Functional Measures: FOTO: 79  
 
Pain Level (0-10 scale) post treatment: 0/10 ASSESSMENT/Changes in Function: Pt demonstrates independence with all exercises. Pt will be DC'd due to personal request.  
 
  See Plan of Care 
[]  See progress note/recertification 
[x]  See Discharge Summary Progress towards goals / Updated goals: 
 
Updated Goals to be accomplished in 2 weeks: 
 1. Patient will report FOTO score of 67 or better to indicate significant improvement in functional status. - met score 79. 10/22/18 2. Pt will demonstrate L shoulder strength 5/5 to improve ease of OH reach.  - not yet met; 4+/5. 10/22/18 3. Patient will improve Left shoulder IR behind back equivalent to right to increase ease with dressing.  - not met. 10/22/18 Frequency / Duration: Patient to be seen 2 times per week for 2 weeks: PLAN 
[]  Upgrade activities as tolerated     []  Continue plan of care 
[]  Update interventions per flow sheet [x]  Discharge due to:patient request. 
[]  Other:_ Alessandra Cameron PTA 10/22/2018  3:40 PM 
 
Future Appointments Date Time Provider Kristi Rich 10/26/2018 11:00 AM Zen Reynolds PTA [facility]  
4/25/2019 10:00 AM Veena Arambula MD [address] [facility]

## 2018-10-26 ENCOUNTER — APPOINTMENT (OUTPATIENT)
Dept: PHYSICAL THERAPY | Age: 63
End: 2018-10-26
Payer: MEDICARE

## 2018-11-08 ENCOUNTER — HOSPITAL ENCOUNTER (OUTPATIENT)
Dept: MAMMOGRAPHY | Age: 63
Discharge: HOME | End: 2018-11-08
Attending: FAMILY MEDICINE
Payer: MEDICARE

## 2018-11-08 DIAGNOSIS — Z12.31 VISIT FOR SCREENING MAMMOGRAM: ICD-10-CM

## 2018-11-08 PROCEDURE — 77067 SCR MAMMO BI INCL CAD: CPT

## 2018-12-04 NOTE — PROGRESS NOTES
In [address]  [place] [address] [place], [address] 
[phone] fax Physical Therapy Discharge Summary Patient name: Natalya Boateng Start of Care: 2018 Referral source: Elizabet Marquez MD : 1955 Medical/Treatment Diagnosis: Left shoulder pain [M25.512] Left shoulder tendinitis [M75.82] Payor: [place] MEDICARE / Plan: [facility] / Product Type: Medicare /  Onset Date: 5-6 months ago Prior Hospitalization: see medical history Provider#: [id number] Medications: Verified on Patient Summary List   
Comorbidities: unremarkable per pt report Prior Level of Function: walking 3 days/ week for an hour, occasional swimming and other exercise Visits from Start of Care: 10    Missed Visits:0 Reporting Period : 10/16/18 to 10/22/18 Summary of Care: 
Progress towards goals / Updated goals: 
 1. Patient will report FOTO score of 67 or better to indicate significant improvement in functional status.  
PN on 10/16/18: 41 
Current: met score 79  
2. Pt will demonstrate L shoulder strength 5/5 to improve ease of OH reach.  - 
PN on 10/16/18: Not assessed Current: not yet met; 4+/5. 10/22/18 3. Patient will improve Left shoulder IR behind back equivalent to right to increase ease with dressing. Current- not met. 10/22/18 
 
 Pt demonstrates independence with all exercises. Pt will be DC'd due to personal request stating that her shoulder feels better. G-Codes (GP) Carry  Goal  CJ= 20-39%  D/C  CJ= 20-39% The severity rating is based on clinical judgment and the FOTO score. ASSESSMENT/RECOMMENDATIONS: 
[x]Discontinue therapy: [x]Patient has reached or is progressing toward set goals []Patient is non-compliant or has abdicated 
    []Due to lack of appreciable progress towards set goals Neel Jeffrey, PT 2018 10:41 AM

## 2019-04-02 ENCOUNTER — HOSPITAL ENCOUNTER (OUTPATIENT)
Age: 64
Discharge: HOME | End: 2019-04-02
Attending: FAMILY MEDICINE
Payer: MEDICARE

## 2019-04-02 DIAGNOSIS — M25.512 LEFT SHOULDER PAIN: ICD-10-CM

## 2019-04-02 PROCEDURE — 73221 MRI JOINT UPR EXTREM W/O DYE: CPT

## 2019-12-24 ENCOUNTER — HOSPITAL ENCOUNTER (OUTPATIENT)
Dept: MAMMOGRAPHY | Age: 64
Discharge: HOME | End: 2019-12-24
Attending: OBSTETRICS & GYNECOLOGY
Payer: MEDICARE

## 2019-12-24 DIAGNOSIS — Z12.31 VISIT FOR SCREENING MAMMOGRAM: ICD-10-CM

## 2019-12-24 PROCEDURE — 77063 BREAST TOMOSYNTHESIS BI: CPT

## 2020-12-17 ENCOUNTER — TRANSCRIBE ORDER (OUTPATIENT)
Dept: SCHEDULING | Age: 65
End: 2020-12-17

## 2020-12-17 DIAGNOSIS — Z78.0 MENOPAUSE: ICD-10-CM

## 2020-12-17 DIAGNOSIS — Z12.31 VISIT FOR SCREENING MAMMOGRAM: Primary | ICD-10-CM

## 2021-01-27 ENCOUNTER — TRANSCRIBE ORDER (OUTPATIENT)
Dept: SCHEDULING | Age: 66
End: 2021-01-27

## 2021-01-27 DIAGNOSIS — Z12.31 VISIT FOR SCREENING MAMMOGRAM: Primary | ICD-10-CM

## 2021-05-24 ENCOUNTER — HOSPITAL ENCOUNTER (OUTPATIENT)
Dept: MAMMOGRAPHY | Age: 66
Discharge: HOME | End: 2021-05-24
Attending: NURSE PRACTITIONER
Payer: MEDICARE

## 2021-05-24 ENCOUNTER — HOSPITAL ENCOUNTER (OUTPATIENT)
Dept: BONE DENSITY | Age: 66
Discharge: HOME | End: 2021-05-24
Attending: NURSE PRACTITIONER
Payer: MEDICARE

## 2021-05-24 DIAGNOSIS — Z12.31 VISIT FOR SCREENING MAMMOGRAM: ICD-10-CM

## 2021-05-24 DIAGNOSIS — Z78.0 MENOPAUSE: ICD-10-CM

## 2021-05-24 PROCEDURE — 77080 DXA BONE DENSITY AXIAL: CPT

## 2021-05-24 PROCEDURE — 77063 BREAST TOMOSYNTHESIS BI: CPT

## 2023-08-30 PROBLEM — Z12.11 SCREENING COLON: Status: ACTIVE | Noted: 2023-08-30

## 2023-11-15 ENCOUNTER — ON CAMPUS - OUTPATIENT (OUTPATIENT)
Dept: URBAN - METROPOLITAN AREA HOSPITAL 16 | Facility: HOSPITAL | Age: 68
End: 2023-11-15
Payer: MEDICARE

## 2023-11-15 DIAGNOSIS — Z12.11 ENCOUNTER FOR SCREENING FOR MALIGNANT NEOPLASM OF COLON: ICD-10-CM

## 2023-11-15 PROCEDURE — G0121 COLON CA SCRN NOT HI RSK IND: HCPCS | Performed by: INTERNAL MEDICINE
